# Patient Record
Sex: FEMALE | URBAN - METROPOLITAN AREA
[De-identification: names, ages, dates, MRNs, and addresses within clinical notes are randomized per-mention and may not be internally consistent; named-entity substitution may affect disease eponyms.]

---

## 2020-08-14 ENCOUNTER — APPOINTMENT (RX ONLY)
Dept: URBAN - METROPOLITAN AREA CLINIC 162 | Facility: CLINIC | Age: 35
Setting detail: DERMATOLOGY
End: 2020-08-14

## 2020-08-14 DIAGNOSIS — L29.89 OTHER PRURITUS: ICD-10-CM

## 2020-08-14 DIAGNOSIS — L91.0 HYPERTROPHIC SCAR: ICD-10-CM

## 2020-08-14 DIAGNOSIS — L82.1 OTHER SEBORRHEIC KERATOSIS: ICD-10-CM

## 2020-08-14 DIAGNOSIS — L29.8 OTHER PRURITUS: ICD-10-CM

## 2020-08-14 DIAGNOSIS — Z71.89 OTHER SPECIFIED COUNSELING: ICD-10-CM

## 2020-08-14 PROCEDURE — 99202 OFFICE O/P NEW SF 15 MIN: CPT

## 2020-08-14 PROCEDURE — ? PRESCRIPTION

## 2020-08-14 PROCEDURE — ? COUNSELING

## 2020-08-14 PROCEDURE — ? ADDITIONAL NOTES

## 2020-08-14 RX ORDER — TRIAMCINOLONE ACETONIDE 1 MG/G
CREAM TOPICAL BID
Qty: 1 | Refills: 5 | Status: ERX | COMMUNITY
Start: 2020-08-14

## 2020-08-14 RX ORDER — AVOBENZONE, OCTOCRYLENE, AND OXYBENZONE 30; 100; 60 MG/G; MG/G; MG/G
CREAM TOPICAL
Qty: 1 | Refills: 5 | Status: ERX | COMMUNITY
Start: 2020-08-14

## 2020-08-14 RX ADMIN — AVOBENZONE, OCTOCRYLENE, AND OXYBENZONE: 30; 100; 60 CREAM TOPICAL at 00:00

## 2020-08-14 RX ADMIN — TRIAMCINOLONE ACETONIDE: 1 CREAM TOPICAL at 00:00

## 2020-08-14 ASSESSMENT — LOCATION DETAILED DESCRIPTION DERM
LOCATION DETAILED: LEFT ANTERIOR DISTAL UPPER ARM
LOCATION DETAILED: LEFT ANTERIOR PROXIMAL UPPER ARM

## 2020-08-14 ASSESSMENT — LOCATION SIMPLE DESCRIPTION DERM: LOCATION SIMPLE: LEFT UPPER ARM

## 2020-08-14 ASSESSMENT — LOCATION ZONE DERM: LOCATION ZONE: ARM

## 2020-08-14 NOTE — HPI: OTHER
Condition:: Self inflicted scars on left wrist and upper arm
Please Describe Your Condition:: Self inflicted scars on wrist and upper arm